# Patient Record
Sex: MALE | Race: WHITE | NOT HISPANIC OR LATINO | Employment: FULL TIME | URBAN - METROPOLITAN AREA
[De-identification: names, ages, dates, MRNs, and addresses within clinical notes are randomized per-mention and may not be internally consistent; named-entity substitution may affect disease eponyms.]

---

## 2017-02-20 ENCOUNTER — COMMUNICATION - HEALTHEAST (OUTPATIENT)
Dept: FAMILY MEDICINE | Facility: CLINIC | Age: 22
End: 2017-02-20

## 2017-06-13 ENCOUNTER — COMMUNICATION - HEALTHEAST (OUTPATIENT)
Dept: FAMILY MEDICINE | Facility: CLINIC | Age: 22
End: 2017-06-13

## 2017-06-13 DIAGNOSIS — Z91.09 OTHER ALLERGY, OTHER THAN TO MEDICINAL AGENTS: ICD-10-CM

## 2017-10-26 ENCOUNTER — RECORDS - HEALTHEAST (OUTPATIENT)
Dept: ADMINISTRATIVE | Facility: OTHER | Age: 22
End: 2017-10-26

## 2018-05-30 ENCOUNTER — COMMUNICATION - HEALTHEAST (OUTPATIENT)
Dept: FAMILY MEDICINE | Facility: CLINIC | Age: 23
End: 2018-05-30

## 2018-06-08 ENCOUNTER — OFFICE VISIT - HEALTHEAST (OUTPATIENT)
Dept: FAMILY MEDICINE | Facility: CLINIC | Age: 23
End: 2018-06-08

## 2018-06-08 DIAGNOSIS — Z91.09 ENVIRONMENTAL ALLERGIES: ICD-10-CM

## 2018-06-08 DIAGNOSIS — L70.8 OTHER ACNE: ICD-10-CM

## 2018-06-08 ASSESSMENT — MIFFLIN-ST. JEOR: SCORE: 1940.4

## 2018-08-09 ENCOUNTER — AMBULATORY - HEALTHEAST (OUTPATIENT)
Dept: NURSING | Facility: CLINIC | Age: 23
End: 2018-08-09

## 2018-08-09 DIAGNOSIS — Z23 IMMUNIZATION DUE: ICD-10-CM

## 2019-03-19 ENCOUNTER — OFFICE VISIT - HEALTHEAST (OUTPATIENT)
Dept: FAMILY MEDICINE | Facility: CLINIC | Age: 24
End: 2019-03-19

## 2019-03-19 DIAGNOSIS — R21 RASH: ICD-10-CM

## 2019-03-19 ASSESSMENT — MIFFLIN-ST. JEOR: SCORE: 1968.47

## 2019-09-20 ENCOUNTER — OFFICE VISIT - HEALTHEAST (OUTPATIENT)
Dept: FAMILY MEDICINE | Facility: CLINIC | Age: 24
End: 2019-09-20

## 2019-09-20 DIAGNOSIS — M75.82 TENDINITIS OF LEFT ROTATOR CUFF: ICD-10-CM

## 2019-09-20 DIAGNOSIS — Z28.21 INFLUENZA VACCINATION DECLINED: ICD-10-CM

## 2019-09-20 DIAGNOSIS — Z87.892 HISTORY OF ANAPHYLAXIS: ICD-10-CM

## 2019-09-23 ENCOUNTER — COMMUNICATION - HEALTHEAST (OUTPATIENT)
Dept: FAMILY MEDICINE | Facility: CLINIC | Age: 24
End: 2019-09-23

## 2019-12-19 ENCOUNTER — RECORDS - HEALTHEAST (OUTPATIENT)
Dept: ADMINISTRATIVE | Facility: OTHER | Age: 24
End: 2019-12-19

## 2020-04-07 ENCOUNTER — COMMUNICATION - HEALTHEAST (OUTPATIENT)
Dept: FAMILY MEDICINE | Facility: CLINIC | Age: 25
End: 2020-04-07

## 2020-04-07 DIAGNOSIS — Z87.892 HISTORY OF ANAPHYLAXIS: ICD-10-CM

## 2020-04-08 RX ORDER — EPINEPHRINE 0.3 MG/.3ML
INJECTION SUBCUTANEOUS
Qty: 2 EACH | Refills: 2 | Status: SHIPPED | OUTPATIENT
Start: 2020-04-08

## 2021-03-30 ENCOUNTER — OFFICE VISIT - HEALTHEAST (OUTPATIENT)
Dept: FAMILY MEDICINE | Facility: CLINIC | Age: 26
End: 2021-03-30

## 2021-03-30 DIAGNOSIS — L98.9 SKIN LESION: ICD-10-CM

## 2021-03-30 ASSESSMENT — MIFFLIN-ST. JEOR: SCORE: 1936.77

## 2021-04-02 LAB
LAB AP CHARGES (HE HISTORICAL CONVERSION): NORMAL
PATH REPORT.COMMENTS IMP SPEC: NORMAL
PATH REPORT.COMMENTS IMP SPEC: NORMAL
PATH REPORT.FINAL DX SPEC: NORMAL
PATH REPORT.GROSS SPEC: NORMAL
PATH REPORT.MICROSCOPIC SPEC OTHER STN: NORMAL
PATH REPORT.MICROSCOPIC SPEC OTHER STN: NORMAL
PATH REPORT.RELEVANT HX SPEC: NORMAL
RESULT FLAG (HE HISTORICAL CONVERSION): NORMAL

## 2021-04-06 ENCOUNTER — COMMUNICATION - HEALTHEAST (OUTPATIENT)
Dept: FAMILY MEDICINE | Facility: CLINIC | Age: 26
End: 2021-04-06

## 2021-06-01 VITALS — BODY MASS INDEX: 30.21 KG/M2 | HEIGHT: 70 IN | WEIGHT: 211 LBS

## 2021-06-01 NOTE — TELEPHONE ENCOUNTER
Who is calling:  Patient   Reason for Call:  Patient is requesting to fax Physical Therapy referral to [facility] Fax number [phone]  Date of last appointment with primary care: 9/20/19  Okay to leave a detailed message: No

## 2021-06-01 NOTE — PROGRESS NOTES
[facility] Clinic Note    Name: Aren Salamanca  : 1995   MRN: [id number]    Aren Salamanca is a 24 y.o. male presenting to discuss the following:     CC:   Chief Complaint   Patient presents with     Shoulder Pain     left shoulder X 1- 2 months ago       HPI:  Thinks had small rotator cuff in college, starting to irritate again. Has been bothersome last 2 months. When doing bench press or overhead press with heavier weights. When first started working out and was getting better was when first started rock climbing.     Symptoms are just on left, no weakness. No numbness. No trauma.     Needs epi pen refill.     ROS:   See HPI above.     PMH:   Patient Active Problem List   Diagnosis     Headache     Acne     Allergies       No past medical history on file.    PSH:   No past surgical history on file.      MEDICATIONS:   Current Outpatient Medications on File Prior to Visit   Medication Sig Dispense Refill     EPINEPHrine (EPIPEN/ADRENACLICK) 0.3 mg/0.3 mL injection INJECT.3ML IN THE MUSCLE AS DIRECTED 2 each 0     No current facility-administered medications on file prior to visit.        ALLERGIES:  No Known Allergies      PHYSICAL EXAM:   /78   Pulse 60   Temp 98.2  F (36.8  C) (Oral)   Resp 16   Wt 212 lb (96.2 kg)   BMI 30.86 kg/m     GENERAL: Aren is a pleasant, well appearing male, in no acute distress.   HEART: Regular rate and rhythm, no murmurs.   LUNGS: Clear to auscultation bilaterally, unlabored.   MSK: Bilateral shoulder range of motion if full. Negative arm drop test. Mild pain left shoulder empty can test. Negative Neer, Speed, and Yergason tests.   NEURO: Strength C5-T1 is 5/5 bilaterally. Sensation intact C5-T1 bilaterally.     ASSESSMENT & PLAN:   Aren Salamanca is a 24 y.o. male presenting today for evaluation of evaluation of left shoulder pain.    1. Tendinitis of left rotator cuff  - naproxen (NAPROSYN) 500 MG tablet; Take 1 tablet (500 mg total) by mouth 2 (two) times a day  with meals.  Dispense: 60 tablet; Refill: 2  - Ambulatory referral to Adult PT- External    Recommended rest, NSAIDS, and physical therapy. I do not suspect tear at this time. If not improving in 6-8 weeks, would consider MRI to further evaluate for structural integrity.     2. History of anaphylaxis  - EPINEPHrine (EPIPEN/ADRENACLICK/AUVI-Q) 0.3 mg/0.3 mL injection; INJECT.3ML IN THE MUSCLE AS DIRECTED  Dispense: 2 each; Refill: 0     Epipen is expiring. Allergic to yellow jacket wasps.     HM: Declined flu shot today.     RTC: March 2020 - annual physical exam     Missy Curry DO

## 2021-06-01 NOTE — TELEPHONE ENCOUNTER
Who is calling:  Patient  Reason for Call:  Patient stated that he would like the referral for PT to be sent to [place] as it will be more convenient for him. Patient stated that he doesn't have a specific group in mind but is open to anything available in [place].  Date of last appointment with primary care: 9/20/19  Okay to leave a detailed message: Yes  [phone]

## 2021-06-01 NOTE — TELEPHONE ENCOUNTER
I did a quick google search and there are quite a few physical therapy options in [place], he should call his insurance and find out which places are in network for him.  We can forward the referral to whichever place he decides to go to.

## 2021-06-01 NOTE — TELEPHONE ENCOUNTER
Left detailed message for pt that he will need to check with his insurance to see which PT in [place] is in-network for him.  Then he can call us back and let us know where he will be going and we can fax the referral at that time.

## 2021-06-02 VITALS — WEIGHT: 217.19 LBS | BODY MASS INDEX: 31.09 KG/M2 | HEIGHT: 70 IN

## 2021-06-03 VITALS
HEART RATE: 60 BPM | SYSTOLIC BLOOD PRESSURE: 112 MMHG | BODY MASS INDEX: 30.86 KG/M2 | TEMPERATURE: 98.2 F | DIASTOLIC BLOOD PRESSURE: 78 MMHG | WEIGHT: 212 LBS | RESPIRATION RATE: 16 BRPM

## 2021-06-05 VITALS
BODY MASS INDEX: 30.09 KG/M2 | SYSTOLIC BLOOD PRESSURE: 132 MMHG | HEART RATE: 60 BPM | HEIGHT: 70 IN | WEIGHT: 210.2 LBS | RESPIRATION RATE: 16 BRPM | DIASTOLIC BLOOD PRESSURE: 82 MMHG

## 2021-06-07 NOTE — TELEPHONE ENCOUNTER
RN cannot approve Refill Request    RN can NOT refill this medication med is not covered by policy/route to provider     . Last office visit: 6/8/2018 Moose Cabral MD Last Physical: Visit date not found Last MTM visit: Visit date not found Last visit same specialty: 9/20/2019 Missy Curry DO.  Next visit within 3 mo: Visit date not found  Next physical within 3 mo: Visit date not found      Enedina Irwin, [facility] Triage/Med Refill 4/8/2020    Requested Prescriptions   Pending Prescriptions Disp Refills     EPINEPHrine (EPIPEN/ADRENACLICK/AUVI-Q) 0.3 mg/0.3 mL injection 2 each 0     Sig: INJECT.3ML IN THE MUSCLE AS DIRECTED       There is no refill protocol information for this order

## 2021-06-16 NOTE — TELEPHONE ENCOUNTER
Patient returning Rica's call. I relayed the provider's message to patient. He stated understanding and no questions.

## 2021-06-16 NOTE — TELEPHONE ENCOUNTER
----- Message from Moose Cabral MD sent at 4/6/2021 12:23 PM CDT -----  Please call.  His skin lesion was benign.  This was called a dermatofibroma.  This can occur in the setting of trauma or insect bites in some circumstances.  No further testing  is needed unless he has any concerns.

## 2021-06-16 NOTE — PATIENT INSTRUCTIONS - HE
The skin lesion was removed and will be sent for pathological review  Keep the area clean and dry  Monitor for signs and symptoms of infection including redness, swelling, or discharge  Please notify me if there is a concern  The stitches will need to be removed in 10-14 days. You may make a follow-up appointment.

## 2021-06-16 NOTE — PROGRESS NOTES
Assessment/ Plan     1. Skin lesion, right arm  Possible cystic structure    Reviewed options including attempt to incise and drain versus punch biopsy versus dermatology referral  Preference was to initially try to drain it and then remove it    PROCEDURE NOTE:    Reviewed informed consent small risk of infection and scarring  Reviewed options.  1% lidocaine with epinephrine was used for anesthesia  Betadine was used to cleanse the skin surface  The tip of a #11 blade scalpel was used to attempt to incise and drain the lesion which was firm  Therefore, a 5 mm punch biopsy was then performed without complication  Two 4-0 Ethilon sutures were then used to approximate the wound edges  There was minimal blood loss and no complications  The wound was then cleaned and dressed    The specimen will be sent for pathological review  Recommend monitoring for signs and symptoms of infection  Recommend follow-up if there are concerns including redness, swelling, or discharge  Follow-up for suture removal in 10-14 days    - Surgical Pathology Exam      Subjective:       Aren Salamanca is a 26 y.o. male who presents to the clinic as he has had a lesion on his right forearm which has been present for the past 10 months.  He cannot think of any specific injury.  He denies redness or drainage.  This is a flesh colored dome-shaped structure which feels firm.  He has had some skin lesions removed from his back previously.    The following portions of the patient's history were reviewed and updated as appropriate: allergies, current medications, past family history, past medical history, past social history, past surgical history and problem list. Medications have been reconciled    Review of Systems   A 12 point comprehensive review of systems was negative except as noted.      Current Outpatient Medications   Medication Sig Dispense Refill     EPINEPHrine (EPIPEN/ADRENACLICK/AUVI-Q) 0.3 mg/0.3 mL injection INJECT.3ML IN THE MUSCLE AS  "DIRECTED 2 each 2     No current facility-administered medications for this visit.        Objective:      /82 (Patient Site: Left Arm, Patient Position: Sitting, Cuff Size: Adult Regular)   Pulse 60   Resp 16   Ht 5' 9.5\" (1.765 m)   Wt 210 lb 3.2 oz (95.3 kg)   BMI 30.60 kg/m      General appearance: alert, appears stated age   Head: normocephalic, without obvious abnormality, atraumatic  Skin: skin color, texture, turgor normal  Examination of the right forearm area reveals a 5 mm dome-shaped firm flesh-colored nodular lesion  Lymph nodes: Cervical nodes normal.  Neurologic: Alert and oriented X 3           No results found for this or any previous visit (from the past 168 hour(s)).       This note has been dictated using voice recognition software. Any grammatical or context distortions are unintentional and inherent to the software    Moose Cabral MD    "

## 2021-06-16 NOTE — TELEPHONE ENCOUNTER
Left message to call back for: Results  Information to relay to patient:  LMTCB, Please see message below from Dr. Cabral

## 2021-06-17 NOTE — PATIENT INSTRUCTIONS - HE
Patient Instructions by Missy Curry DO at 3/19/2019  8:20 AM     Author: Missy Curry DO Service: -- Author Type: Physician    Filed: 3/19/2019  8:42 AM Encounter Date: 3/19/2019 Status: Addendum    : Missy Curry DO (Physician)    Related Notes: Original Note by Missy Curry DO (Physician) filed at 3/19/2019  8:41 AM         Patient Education     Pityriasis Rosea  This is a harmless non-contagious rash. The exact cause is unknown. It is not an allergic reaction, and does not seem to be caused by a viral or fungal infection. Although anyone can get it, it is most often seen in children and young adults ages 10 to 35.  Pityriasis usually resolves on its own without treatment in 2 weeks.  In some people it may take 6 to 8 weeks to clear up.   Home care    For dry skin, use a moisturizing cream. For itchiness, use 1% hydrocortisone cream (no prescription needed) or calamine lotion 2 to 3 times a day.    Exposure to natural sunlight helps some people. It may help fade the rash, but doesn't seem to help the itching. Don't overdo it in the sun, as your skin may be more sensitive than usual. You dont want to burn yourself. Artificial sun lamps, sun beds, and tanning salons are not recommended.    This condition is not contagious. Your child may attend  or school while the rash is present.  Medicines  Talk with your healthcare provider before using any medicines. All medicines have side effects.    Medicines will not get rid of the rash.     Moisturizing skin creams may help.    Antihistamines may help with itching, but they can make you sleepy.    Topical steroids are sometimes used.  Follow-up care  Follow up with your healthcare provider, or as advised. Call your provider if the itching is not controlled by the above suggestions, or if the rash lasts longer than 3 months.  When to seek medical advice  Call your healthcare provider right away if any of these occur:    You develop a rash and are  pregnant    Severe itching    Signs of infection in the skin (increasing redness, drainage of pus, yellow-brown scabs)    Fever or other symptoms of a new illness  Date Last Reviewed: 8/1/2016 2000-2017 [employer]. [address] [place] [address], [place] [address]. All rights reserved. This information is not intended as a substitute for professional medical care. Always follow your healthcare professional's instructions.

## 2021-06-18 NOTE — PROGRESS NOTES
"Assessment/ Plan     1. Environmental allergies  Allergic reaction to bee stings    Patient was given a refill of an EpiPen to use as needed  He may start Benadryl if stung by a bee.  He may take 50 mg every 6 hours as needed  He may take Claritin or Zyrtec as needed  Follow-up recommended if not responding    2. Acne    Continue doxycycline as prescribed by dermatology  Follow-up as needed      Subjective:       Aren Salamanca is a 23 y.o. male who presents to the clinic for a refill of his EpiPen.  He has had an allergic reaction to bee stings and has had a severe allergic reaction with swelling.  He has used an EpiPen previously and would like to have one on hand.  He has not had a recent reaction.  Medical history is otherwise notable for acne treated by dermatology.  He is treated with oral doxycycline.    He otherwise feels well denies other health concerns.  Social history is notable for the fact that he graduated from the [employer] as a  and is currently searching for a job.  He currently lives at home.    Review of systems is negative for headache, chest pain, breathing concerns, or bowel changes.    The following portions of the patient's history were reviewed and updated as appropriate: allergies, current medications, past family history, past medical history, past social history, past surgical history and problem list.    Review of Systems   A 12 point comprehensive review of systems was negative except as noted.      Current Outpatient Prescriptions   Medication Sig Dispense Refill     doxycycline (VIBRAMYCIN) 50 MG capsule Take 50 mg by mouth daily.  2     EPINEPHrine (EPIPEN/ADRENACLICK) 0.3 mg/0.3 mL injection INJECT.3ML IN THE MUSCLE AS DIRECTED 2 each 0     No current facility-administered medications for this visit.        Objective:      /70  Pulse 60  Temp 98.1  F (36.7  C) (Oral)   Resp 16  Ht 5' 9.5\" (1.765 m)  Wt 211 lb (95.7 kg)  BMI 30.71 " kg/m2      General appearance: alert, appears stated age and cooperative  Head: Normocephalic, without obvious abnormality, atraumatic  Eyes: conjunctivae/corneas clear. PERRL, EOM's intact.   Nose: Nares normal. Septum midline  Throat: lips, mucosa, and tongue normal; teeth and gums normal  Neck: no adenopathy, supple, symmetrical  Lungs: clear to auscultation bilaterally  Heart: regular rate and rhythm, S1, S2 normal, no murmur, click, rub or gallop  Extremities: extremities normal, atraumatic, no cyanosis or edema  Skin: Skin color, texture, turgor normal. No rashes or lesions  Lymph nodes: Cervical nodes normal.  Neurologic: Alert and oriented X 3. Normal coordination and gait         No results found for this or any previous visit (from the past 168 hour(s)).       This note has been dictated using voice recognition software. Any grammatical or context distortions are unintentional and inherent to the software

## 2021-06-25 NOTE — PROGRESS NOTES
"[facility] Clinic Note    Aren Salamanca  1995   [id number]    Aren Salamanca is a 24 y.o. male presenting to discuss the following:     CC:   Chief Complaint   Patient presents with     Rash       HPI:  Aren presents today for evaluation of persistent rash.  First noticed a larger lesion on his left forearm, circular, no scaling, does not itch, is asymptomatic.  Then noticed spreading to his trunk.  Again, lesions are asymptomatic.  Symptoms have been present for the last 4 weeks, not getting any better.  Therefore, he came in for further evaluation.    ROS:   See HPI above.     PMH:   Patient Active Problem List   Diagnosis     Headache     Acne     Allergies       No past medical history on file.    PSH:   No past surgical history on file.      MEDICATIONS:   Current Outpatient Medications on File Prior to Visit   Medication Sig Dispense Refill     EPINEPHrine (EPIPEN/ADRENACLICK) 0.3 mg/0.3 mL injection INJECT.3ML IN THE MUSCLE AS DIRECTED 2 each 0     [DISCONTINUED] doxycycline (VIBRAMYCIN) 50 MG capsule Take 50 mg by mouth daily.  2     No current facility-administered medications on file prior to visit.        ALLERGIES:  No Known Allergies    PHYSICAL EXAM:   /72   Pulse 60   Temp 98.3  F (36.8  C) (Oral)   Resp 16   Ht 5' 9.5\" (1.765 m)   Wt 217 lb 3 oz (98.5 kg)   BMI 31.61 kg/m     GENERAL: Aren is a pleasant, well appearing male in no acute distress.   HEART: Regular rate and rhythm, no murmurs.  LUNGS: Clear to auscultation bilaterally, unlabored.   DERM: Circular edematous patches with a few raised smaller papules with largest lesion on the right forearm approximately 2 cm in diameter and lesions on trunk approximately 1/2-1 cm in diameter.  Scaling on lesions.    ASSESSMENT & PLAN:   Aren Salamanca is a 24 y.o. male presenting today for evaluation of rash which is not going away.    1. Rash  Rashes consistent with pityriasis rosea.  Discussed that this is viral and self resolving.  " There are no prescriptions to make this go away faster.  May utilize topical steroids or antihistamines if pruritic, but is he is asymptomatic, this is not necessary.  Discussed symptoms usually persist for 6-8 weeks, but occasionally can last longer.    If lesions are getting bigger, spreading, or becoming more itchy, would be reasonable to trial topical antifungal for tinea lesion empirically.  However, I do not think this would be beneficial at this time.    RTC: 1 year - annual physical exam     Missy Curry,

## 2021-08-05 ENCOUNTER — TELEPHONE (OUTPATIENT)
Dept: ORTHOPEDICS | Facility: CLINIC | Age: 26
End: 2021-08-05

## 2021-08-05 NOTE — TELEPHONE ENCOUNTER
DIAGNOSIS: knees/ ref by former patient of Dr. Mccall/ patient having imaging and records pushed from [facility]/ no previous surgery/ [facility]   APPOINTMENT DATE: 8.10.21   NOTES STATUS DETAILS   OFFICE NOTE from referring provider N/A    OFFICE NOTE from other specialist Care Everywhere PT in Care Everywhere  10.16.20 VICENTE Lucas   DISCHARGE SUMMARY from hospital N/A    DISCHARGE REPORT from the ER N/A    OPERATIVE REPORT N/A    MEDICATION LIST N/A    EMG (for Spine) N/A    IMPLANT RECORD/STICKER N/A    LABS     CBC/DIFF N/A    CULTURES N/A    INJECTIONS DONE IN RADIOLOGY N/A    MRI N/A    CT SCAN N/A    XRAYS (IMAGES & REPORTS) PACS 10.16.20 L knee, R knee, [facility]   TUMOR     PATHOLOGY  Slides & report N/A      Action 8.5.21 MJ   Action Taken Requested med recs and imaging from [facility].

## 2021-08-05 NOTE — TELEPHONE ENCOUNTER
Informed patient that our medical records department is working on obtaining records from [facility]. ATC thanked patient for being proactive about this.    He has no further questions and was appreciative of the return call.    ROSENDA Goodman

## 2021-08-05 NOTE — TELEPHONE ENCOUNTER
[facility] Call Center    Phone Message    May a detailed message be left on voicemail: yes     Reason for Call: Other: Patient want to make sure that xray and medical records were received from [facility]. Please call patient back to confirm.     Action Taken: Message routed to:  [facility] ([facility] Orthopedics    Travel Screening: Not Applicable

## 2021-08-10 ENCOUNTER — OFFICE VISIT (OUTPATIENT)
Dept: ORTHOPEDICS | Facility: CLINIC | Age: 26
End: 2021-08-10
Payer: COMMERCIAL

## 2021-08-10 ENCOUNTER — PRE VISIT (OUTPATIENT)
Dept: ORTHOPEDICS | Facility: CLINIC | Age: 26
End: 2021-08-10

## 2021-08-10 VITALS — HEIGHT: 71 IN | WEIGHT: 210 LBS | BODY MASS INDEX: 29.4 KG/M2

## 2021-08-10 DIAGNOSIS — M76.899 QUADRICEPS TENDONITIS: Primary | ICD-10-CM

## 2021-08-10 PROCEDURE — 99203 OFFICE O/P NEW LOW 30 MIN: CPT | Mod: GC | Performed by: ORTHOPAEDIC SURGERY

## 2021-08-10 ASSESSMENT — MIFFLIN-ST. JEOR: SCORE: 1951.74

## 2021-08-10 NOTE — LETTER
8/10/2021         RE: Aren Salamanca  [address] [address]        Dear Colleague,    Thank you for referring your patient, Aren Salamanca, to the [facility] [place]. Please see a copy of my visit note below.    CHIEF CONCERN:   Chief Complaint   Patient presents with     Consult     bilateral knee        HISTORY:   Aren Salamanca is a 26 year old male who presents to clinic today for evaluation of bilateral knee pain. Pt states that his pain began in spring of 2020. It is localized to the anterior aspect of his knees above his patella. Left is worse than right. It is worse with stairs, weighted squats, and running. It is limiting his activities. He has tried PT for 2-3 months last fall. This initially made him better but he has recently began to have worsening symptoms. He has not had any injections or an MRI.       PAST MEDICAL HISTORY: (Reviewed with the patient and in the [facility] medical record)  No past medical history on file.    PAST SURGICAL HISTORY: (Reviewed with the patient and in the [facility] medical record)  No past surgical history on file.    MEDICATIONS: (Reviewed with the patient and in the [facility] medical record)  Notable medications include:  Current Outpatient Medications   Medication Sig Dispense Refill     EPINEPHrine (EPIPEN/ADRENACLICK/AUVI-Q) 0.3 mg/0.3 mL injection [EPINEPHRINE (EPIPEN/ADRENACLICK/AUVI-Q) 0.3 MG/0.3 ML INJECTION] INJECT.3ML IN THE MUSCLE AS DIRECTED 2 each 2        ALLERGIES: (Reviewed with the patient and in the [facility] medical record)  Allergies   Allergen Reactions     Wasp Venom [Wasp Venom Protein] Unknown       SOCIAL HISTORY: (Reviewed with the patient and in the medical record)  --Tobacco: none  --Occupation:   --Avocation/Sport: weight lifting, running    FAMILY HISTORY: (Reviewed with the patient and in the medical record)  -- No family history of bleeding, clotting, or difficulty with anesthesia    REVIEW  "OF SYSTEMS: (Reviewed with the patient and on the health intake form)  -- A comprehensive 10 point review of systems was conducted and is negative except as noted in the HPI    EXAM:   General: Awake, Alert and Oriented, No acute Distress. Articulate and Interactive  Ht 1.791 m (5' 10.5\")   Wt 95.3 kg (210 lb)   BMI 29.71 kg/m     HEENT: NC/AT  Cardiovascular: regular rate  Pulmonary: unlabored breathing  Neuro: motor/sensory grossly intact  Psych: normal mood and affect    Left lower extremity and knee exam:    Skin is Warm and Well perfused, no suggestion of infection, no previous incisions    ROM: 0-135    No effusion. No medial joint line pain. No lateral joint line pain. Tender over quad tendon insertion at proximal patella    Full range of motion, negative McMurrays    Stable to varus and valgus stress    Negative Lachmans, negative posterior drawer, negative pivot shift    Negative patellar tilt, negative patellar laxity, no J-sign present, no crepitus    EHL/FHL/TA/GS 5/5    Sensation intact L3-S1    Brisk distal capillary refill    Right lower extremity and knee exam:    Skin is Warm and Well perfused, no suggestion of infection, no previous incisions    ROM: 0-135    No effusion. No medial joint line pain. No lateral joint line pain. Tender over quad tendon insertion at proximal patella    Full range of motion, negative McMurrays    Stable to varus and valgus stress    Negative Lachmans, negative posterior drawer, negative pivot shift    Negative patellar tilt, negative patellar laxity, no J-sign present, no crepitus    EHL/FHL/TA/GS 5/5    Sensation intact L3-S1    Brisk distal capillary refill    IMAGING:  Plain Radiographs: AP, lateral, and sunrise views of bilateral knees reviewed and demonstrate no acute osseous abnormality and no degenerative change       ASSESSMENT:  1. 26 year old male with bilateral quadriceps insertional tendinopathy vs patellofemoral chondral lesion    PLAN:  1. We discussed " the possible pathologies regarding his knees. At this point we recommend obtaining an MRI to better evaluate his quad tendon and the patellofemoral articular cartilage. We will schedule this today. Dr. Mccall will call him with the results of his MRI and further recommendations, including possible PRP injection. He is in agreement with this plan. All questions answered prior to his departure from clinic.     I spent 35 minutes with this patient, 30 minutes dedicated to counseling, education and development of a treatment plan    Jeffrey Marroquin MD  Fellow, Sports Medicine & Shoulder  [facility] Orthopaedic Surgery    I was present with the fellow during the history and exam.  I discussed the case with the fellow and agree with the findings as documented in the assessment and plan.      Brant Mccall MD

## 2021-08-10 NOTE — PROGRESS NOTES
CHIEF CONCERN:   Chief Complaint   Patient presents with     Consult     bilateral knee        HISTORY:   Aren Salamanca is a 26 year old male who presents to clinic today for evaluation of bilateral knee pain. Pt states that his pain began in spring of 2020. It is localized to the anterior aspect of his knees above his patella. Left is worse than right. It is worse with stairs, weighted squats, and running. It is limiting his activities. He has tried PT for 2-3 months last fall. This initially made him better but he has recently began to have worsening symptoms. He has not had any injections or an MRI.       PAST MEDICAL HISTORY: (Reviewed with the patient and in the EPIC medical record)  No past medical history on file.    PAST SURGICAL HISTORY: (Reviewed with the patient and in the EPIC medical record)  No past surgical history on file.    MEDICATIONS: (Reviewed with the patient and in the EPIC medical record)  Notable medications include:  Current Outpatient Medications   Medication Sig Dispense Refill     EPINEPHrine (EPIPEN/ADRENACLICK/AUVI-Q) 0.3 mg/0.3 mL injection [EPINEPHRINE (EPIPEN/ADRENACLICK/AUVI-Q) 0.3 MG/0.3 ML INJECTION] INJECT.3ML IN THE MUSCLE AS DIRECTED 2 each 2        ALLERGIES: (Reviewed with the patient and in the EPIC medical record)  Allergies   Allergen Reactions     Wasp Venom [Wasp Venom Protein] Unknown       SOCIAL HISTORY: (Reviewed with the patient and in the medical record)  --Tobacco: none  --Occupation:   --Avocation/Sport: weight lifting, running    FAMILY HISTORY: (Reviewed with the patient and in the medical record)  -- No family history of bleeding, clotting, or difficulty with anesthesia    REVIEW OF SYSTEMS: (Reviewed with the patient and on the health intake form)  -- A comprehensive 10 point review of systems was conducted and is negative except as noted in the HPI    EXAM:   General: Awake, Alert and Oriented, No acute Distress. Articulate and  "Interactive  Ht 1.791 m (5' 10.5\")   Wt 95.3 kg (210 lb)   BMI 29.71 kg/m     HEENT: NC/AT  Cardiovascular: regular rate  Pulmonary: unlabored breathing  Neuro: motor/sensory grossly intact  Psych: normal mood and affect    Left lower extremity and knee exam:    Skin is Warm and Well perfused, no suggestion of infection, no previous incisions    ROM: 0-135    No effusion. No medial joint line pain. No lateral joint line pain. Tender over quad tendon insertion at proximal patella    Full range of motion, negative McMurrays    Stable to varus and valgus stress    Negative Lachmans, negative posterior drawer, negative pivot shift    Negative patellar tilt, negative patellar laxity, no J-sign present, no crepitus    EHL/FHL/TA/GS 5/5    Sensation intact L3-S1    Brisk distal capillary refill    Right lower extremity and knee exam:    Skin is Warm and Well perfused, no suggestion of infection, no previous incisions    ROM: 0-135    No effusion. No medial joint line pain. No lateral joint line pain. Tender over quad tendon insertion at proximal patella    Full range of motion, negative McMurrays    Stable to varus and valgus stress    Negative Lachmans, negative posterior drawer, negative pivot shift    Negative patellar tilt, negative patellar laxity, no J-sign present, no crepitus    EHL/FHL/TA/GS 5/5    Sensation intact L3-S1    Brisk distal capillary refill    IMAGING:  Plain Radiographs: AP, lateral, and sunrise views of bilateral knees reviewed and demonstrate no acute osseous abnormality and no degenerative change       ASSESSMENT:  1. 26 year old male with bilateral quadriceps insertional tendinopathy vs patellofemoral chondral lesion    PLAN:  1. We discussed the possible pathologies regarding his knees. At this point we recommend obtaining an MRI to better evaluate his quad tendon and the patellofemoral articular cartilage. We will schedule this today. Dr. Mccall will call him with the results of his MRI and " further recommendations, including possible PRP injection. He is in agreement with this plan. All questions answered prior to his departure from clinic.     I spent 35 minutes with this patient, 30 minutes dedicated to counseling, education and development of a treatment plan    Jeffrey Marroquin MD  Fellow, Sports Medicine & Shoulder  [facility]    I was present with the fellow during the history and exam.  I discussed the case with the fellow and agree with the findings as documented in the assessment and plan.

## 2021-08-20 ENCOUNTER — ANCILLARY PROCEDURE (OUTPATIENT)
Dept: MRI IMAGING | Facility: CLINIC | Age: 26
End: 2021-08-20
Attending: ORTHOPAEDIC SURGERY
Payer: COMMERCIAL

## 2021-08-20 PROCEDURE — 73721 MRI JNT OF LWR EXTRE W/O DYE: CPT | Mod: LT | Performed by: RADIOLOGY

## 2021-08-20 PROCEDURE — 73721 MRI JNT OF LWR EXTRE W/O DYE: CPT | Mod: RT | Performed by: RADIOLOGY

## 2021-08-31 DIAGNOSIS — M76.899 QUADRICEPS TENDONITIS: Primary | ICD-10-CM

## 2021-08-31 DIAGNOSIS — M76.892 TENDINITIS OF BOTH QUADRICEP TENDONS: ICD-10-CM

## 2021-08-31 DIAGNOSIS — M76.891 TENDINITIS OF BOTH QUADRICEP TENDONS: ICD-10-CM

## 2021-09-27 ENCOUNTER — THERAPY VISIT (OUTPATIENT)
Dept: PHYSICAL THERAPY | Facility: CLINIC | Age: 26
End: 2021-09-27
Attending: ORTHOPAEDIC SURGERY
Payer: COMMERCIAL

## 2021-09-27 DIAGNOSIS — M76.899 QUADRICEPS TENDONITIS: Primary | ICD-10-CM

## 2021-09-27 PROCEDURE — 97110 THERAPEUTIC EXERCISES: CPT | Mod: GP | Performed by: PHYSICAL THERAPIST

## 2021-09-27 PROCEDURE — 97161 PT EVAL LOW COMPLEX 20 MIN: CPT | Mod: GP | Performed by: PHYSICAL THERAPIST

## 2021-09-27 ASSESSMENT — ACTIVITIES OF DAILY LIVING (ADL)
KNEE_ACTIVITY_OF_DAILY_LIVING_SCORE: 88.57
SIT WITH YOUR KNEE BENT: ACTIVITY IS NOT DIFFICULT
GO DOWN STAIRS: ACTIVITY IS MINIMALLY DIFFICULT
GO UP STAIRS: ACTIVITY IS MINIMALLY DIFFICULT
RISE FROM A CHAIR: ACTIVITY IS NOT DIFFICULT
WALK: ACTIVITY IS NOT DIFFICULT
HOW_WOULD_YOU_RATE_THE_OVERALL_FUNCTION_OF_YOUR_KNEE_DURING_YOUR_USUAL_DAILY_ACTIVITIES?: NEARLY NORMAL
WEAKNESS: THE SYMPTOM AFFECTS MY ACTIVITY SLIGHTLY
PAIN: THE SYMPTOM AFFECTS MY ACTIVITY SLIGHTLY
GIVING WAY, BUCKLING OR SHIFTING OF KNEE: I DO NOT HAVE THE SYMPTOM
SWELLING: I DO NOT HAVE THE SYMPTOM
SQUAT: ACTIVITY IS MINIMALLY DIFFICULT
AS_A_RESULT_OF_YOUR_KNEE_INJURY,_HOW_WOULD_YOU_RATE_YOUR_CURRENT_LEVEL_OF_DAILY_ACTIVITY?: NEARLY NORMAL
HOW_WOULD_YOU_RATE_THE_CURRENT_FUNCTION_OF_YOUR_KNEE_DURING_YOUR_USUAL_DAILY_ACTIVITIES_ON_A_SCALE_FROM_0_TO_100_WITH_100_BEING_YOUR_LEVEL_OF_KNEE_FUNCTION_PRIOR_TO_YOUR_INJURY_AND_0_BEING_THE_INABILITY_TO_PERFORM_ANY_OF_YOUR_USUAL_DAILY_ACTIVITIES?: 60
KNEE_ACTIVITY_OF_DAILY_LIVING_SUM: 62
STIFFNESS: I DO NOT HAVE THE SYMPTOM
STAND: ACTIVITY IS MINIMALLY DIFFICULT
KNEEL ON THE FRONT OF YOUR KNEE: ACTIVITY IS NOT DIFFICULT
RAW_SCORE: 62
LIMPING: I DO NOT HAVE THE SYMPTOM

## 2021-09-27 NOTE — PROGRESS NOTES
Physical Therapy Initial Examination/Evaluation  September 27, 2021    Aren Salamanca is a 26 year old male referred to physical therapy by Brant Mccall MD for treatment of quadriceps tendonitis with Precautions/Restrictions/MD instructions E&T    Therapist Impression:   Aren presents to therapy with chronic bilateral knee pain. He demonstrates impaired squatting technique with single leg stance, decreased muscle length, decreased strength, and pain affecting function - all of which are consistent with the referring providers diagnosis. Skilled therapy is required to address the limitations listed above, make progress towards therapy goals, improve quality of life, and return to previous level of function.     Subjective:  DOI/onset: 4/1/2020 DOS: NA  In April 2020, Aren started developing knee pain when working out at home during the pandemic. Went back to the gym and started lifting his same weight and developing more pain. Did start seeing PT and it has been fluctuating ever since then. Did have a recent MRI showing quadriceps tendonitis bilaterally. The past 6 weeks it has been getting better - has started over from zero with light weight and a slant angle under his feet.   Acute Injury or Gradual Onset?: Gradual injury over time  Mechanism of Injury: unknown  Related PMH: none Previous Treatment: PT Effect of prior treatment: good  Imaging: MRI   Left IMPRESSION:   1. Partial focal tear distal most quadriceps tendon at the superior  pole of the patella involving the vastus intermedius portion with  dimensions as noted above. No evidence of full-thickness tear.  2. Tiny area chondromalacia lateral patellar facet.   Right Impression:  1. Small focal tear of the mid-substance of the distal quadriceps  tendon. Slightly smaller area of involvement than the contralateral  knee.   2. Mild chondromalacia of the patellofemoral compartment.   Chief Complaint/Functional Limitations:   Lifting weight  and see below in  therapy evaluation codes   Pain: rest 0 /10, activity 3/10 Location: quadriceps tendon Frequency: Intermittent Described as: dull Alleviated by: Rest and Movement Progression of Symptoms: Gradually getting better. Time of day when pain is worse: Activity related  Current HEP/exercise regimen: weight lift 3x/week, olympic lifting (prior to injury)  Patient's goals are see chief complaints get back to lifting, running     Answers for HPI/ROS submitted by the patient on 9/22/2021  Reason for Visit:: Physical Therapy  When problem began:: 4/1/2020  How problem occurred:: lifting  Number scale: 1/10  General health as reported by patient: good  Please check all that apply to your current or past medical history: none  Medical allergies: none  Surgeries: none  Medications you are currently taking: none  Occupation::   What are your primary job tasks: computer work, prolonged sitting    Return to MD:  PRN    KNEE EVALUATION    Static Posture  No obvious findings    Dynamic Movement Screen  Single leg stance observations: No significant findings for eyes open/closed  Double limb squat observations: Able to perform full deep squat with pain  Single limb squat observations: Anterior knee translation, Knee valgus and Contralateral hip drop  Gait: No significant findings    Range of Motion  Hip Joint Screen: WNL      Knee ROM Extension Flexion   Left 0 140   Right 0 140      Flexibility Quadriceps Hamstrings Ankle Figure 4   Left moderate mild none/WNL moderate   Right moderate mild none/WNL moderate     Hip and Knee Strength   MMT Hip Abduction Hip Extension Knee Flexion   Left 4/5 4+/5 5/5   Right 4+/5 4+/5 4/5     Knee MMT Quadriceps set Straight Leg Raise   Left Good Good   Right Good Good     Knee ligaments and meniscus: Not assessed    Patellofemoral assessment: negative findings    Palpation  Left: No tenderness to palpation  Right: No tenderness to palpation    Assessment/Plan:  Patient is a 26 year  old male with both sides knee complaints.    Patient has the following significant findings with corresponding treatment plan.                Diagnosis 1:  Bilateral Knee  Pain -  manual therapy, splint/taping/bracing/orthotics, self management, education and home program  Decreased joint mobility - manual therapy and therapeutic exercise  Decreased strength - therapeutic exercise and therapeutic activities    Therapy Evaluation Codes:     Cumulative Therapy Evaluation is: Low complexity.    Previous and current functional limitations:  (See Goal Flow Sheet for this information)    Short term and Long term goals: (See Goal Flow Sheet for this information)     Communication ability:  Patient appears to be able to clearly communicate and understand verbal and written communication and follow directions correctly.  Treatment Explanation - The following has been discussed with the patient:   RX ordered/plan of care  Anticipated outcomes  Possible risks and side effects  This patient would benefit from PT intervention to resume normal activities.   Rehab potential is excellent.    Frequency:  2 X a month, once daily  Duration:  for 3 months  Discharge Plan:  Achieve all LTG.  Independent in home treatment program.  Reach maximal therapeutic benefit.    Please refer to the daily flowsheet for treatment today, total treatment time and time spent performing 1:1 timed codes.     Please refer to the daily flowsheet for treatment today, total treatment time and time spent performing 1:1 timed codes.

## 2021-10-10 ENCOUNTER — HEALTH MAINTENANCE LETTER (OUTPATIENT)
Age: 26
End: 2021-10-10

## 2021-10-11 ENCOUNTER — THERAPY VISIT (OUTPATIENT)
Dept: PHYSICAL THERAPY | Facility: CLINIC | Age: 26
End: 2021-10-11
Payer: COMMERCIAL

## 2021-10-11 DIAGNOSIS — M76.899 QUADRICEPS TENDONITIS: Primary | ICD-10-CM

## 2021-10-11 PROCEDURE — 97110 THERAPEUTIC EXERCISES: CPT | Mod: GP | Performed by: PHYSICAL THERAPIST

## 2021-10-25 ENCOUNTER — THERAPY VISIT (OUTPATIENT)
Dept: PHYSICAL THERAPY | Facility: CLINIC | Age: 26
End: 2021-10-25
Payer: COMMERCIAL

## 2021-10-25 DIAGNOSIS — M76.899 QUADRICEPS TENDONITIS: ICD-10-CM

## 2021-10-25 PROCEDURE — 97110 THERAPEUTIC EXERCISES: CPT | Mod: GP | Performed by: PHYSICAL THERAPIST

## 2021-11-22 ENCOUNTER — THERAPY VISIT (OUTPATIENT)
Dept: PHYSICAL THERAPY | Facility: CLINIC | Age: 26
End: 2021-11-22
Payer: COMMERCIAL

## 2021-11-22 DIAGNOSIS — M76.899 QUADRICEPS TENDONITIS: ICD-10-CM

## 2021-11-22 PROCEDURE — 97110 THERAPEUTIC EXERCISES: CPT | Mod: GP | Performed by: PHYSICAL THERAPIST

## 2021-11-22 PROCEDURE — 97530 THERAPEUTIC ACTIVITIES: CPT | Mod: GP | Performed by: PHYSICAL THERAPIST

## 2021-11-22 PROCEDURE — 97140 MANUAL THERAPY 1/> REGIONS: CPT | Mod: GP | Performed by: PHYSICAL THERAPIST

## 2022-01-15 PROBLEM — M76.899 QUADRICEPS TENDONITIS: Status: RESOLVED | Noted: 2021-09-27 | Resolved: 2022-01-15

## 2022-01-15 NOTE — PROGRESS NOTES
Patient did not return for further treatment and no additional progress was noted.  Please refer to the progress note and goal flowsheet completed on   for discharge information.    Francisca Aragon, PT

## 2022-09-18 ENCOUNTER — HEALTH MAINTENANCE LETTER (OUTPATIENT)
Age: 27
End: 2022-09-18

## 2023-01-29 ENCOUNTER — HEALTH MAINTENANCE LETTER (OUTPATIENT)
Age: 28
End: 2023-01-29

## 2023-01-31 ENCOUNTER — E-VISIT (OUTPATIENT)
Dept: FAMILY MEDICINE | Facility: CLINIC | Age: 28
End: 2023-01-31
Payer: COMMERCIAL

## 2023-01-31 DIAGNOSIS — Z71.84 TRAVEL ADVICE ENCOUNTER: Primary | ICD-10-CM

## 2023-01-31 PROCEDURE — 99422 OL DIG E/M SVC 11-20 MIN: CPT | Performed by: FAMILY MEDICINE

## 2023-02-01 RX ORDER — ATOVAQUONE AND PROGUANIL HYDROCHLORIDE 250; 100 MG/1; MG/1
1 TABLET, FILM COATED ORAL DAILY
Qty: 24 TABLET | Refills: 0 | Status: SHIPPED | OUTPATIENT
Start: 2023-02-01 | End: 2023-11-20

## 2023-02-01 NOTE — PATIENT INSTRUCTIONS
Aren,    I will send a prescription for Malarone to your pharmacy as I did for your father.  You will start this 1 to 2 days prior to travel and then take this during travel and for a week following travel.    I also sent a prescription for a typhoid vaccine for your dad but did not yet send that for you.  That is a prescription which includes 4 capsules and may protect a diarrhea type of illness.  Please have me know if you like to consider that as well.    Also, the CDC has a good website regarding travel which gives recommendations based on where you are traveling.  You may want to check that out.    I hope you have a great trip.    Moose Cabral MD

## 2023-09-07 ENCOUNTER — TRANSFERRED RECORDS (OUTPATIENT)
Dept: HEALTH INFORMATION MANAGEMENT | Facility: CLINIC | Age: 28
End: 2023-09-07
Payer: COMMERCIAL

## 2023-11-20 ENCOUNTER — OFFICE VISIT (OUTPATIENT)
Dept: ALLERGY | Facility: CLINIC | Age: 28
End: 2023-11-20
Attending: FAMILY MEDICINE
Payer: COMMERCIAL

## 2023-11-20 VITALS — HEART RATE: 73 BPM | HEIGHT: 70 IN | WEIGHT: 225.4 LBS | BODY MASS INDEX: 32.27 KG/M2 | OXYGEN SATURATION: 99 %

## 2023-11-20 DIAGNOSIS — T63.461A ALLERGIC REACTION TO WASP STING: ICD-10-CM

## 2023-11-20 PROCEDURE — 86003 ALLG SPEC IGE CRUDE XTRC EA: CPT | Performed by: ALLERGY & IMMUNOLOGY

## 2023-11-20 PROCEDURE — 99243 OFF/OP CNSLTJ NEW/EST LOW 30: CPT | Performed by: ALLERGY & IMMUNOLOGY

## 2023-11-20 PROCEDURE — 36415 COLL VENOUS BLD VENIPUNCTURE: CPT | Performed by: ALLERGY & IMMUNOLOGY

## 2023-11-20 NOTE — LETTER
"    11/20/2023         RE: Aren Salamanca  [address] [facility] [address]        Dear Colleague,    Thank you for referring your patient, Aren Salamanca, to the [facility]. Please see a copy of my visit note below.          Subjective  Aren is a 28 year old, presenting for the following health issues:  Allergy Consult (wasp sting)    HPI     Chief complaint: Wasp venom allergy    History of present illness: This is a pleasant 28-year-old gentleman that was asked to see for evaluation by Dr. Diop in regards to a possible bee sting reaction.  Patient states that when he was around 13 years of age he developed a large local swelling on his arm after being stung.  He states it was very painful.  After that time he was taken to an allergist and tested and was told he was slightly positive to some of the venomous insects.  He then carry an epinephrine device and had it administered twice to him when he was stung but he had no symptoms when the epinephrine device was administered.  He is wondering if he needs to continue to carry this.  He does not recollect any systemic hives, swelling or shortness of breath when he was initially stung.  I do not have any records to review.    Past medical history: Otherwise unremarkable    Social history: Non-smoker    Family history: Noncontributory      Review of Systems   Constitutional, HEENT, cardiovascular, pulmonary, gi and gu systems are negative, except as otherwise noted.      Objective   Pulse 73   Ht 1.778 m (5' 10\")   Wt 102.2 kg (225 lb 6.4 oz)   SpO2 99%   BMI 32.34 kg/m    Body mass index is 32.34 kg/m .  Physical Exam     Gen: Pleasant male not in acute distress  HEENT: Eyes no erythema of the bulbar or palpebral conjunctiva, no edema. Ears: No deformities or lesions. Nose: No congestion,  Mouth: Throat clear, no lip or tongue edema.   Neck: No masses lesions or swelling  Respiratory: No coughing with breathing, no " retractions  Lymph: No visible supraclavicular or cervical lymphadenopathy  Skin: No rashes or lesions  Psych: Alert and appropriate for age    Impression report and plan:    1.  Possible allergic reaction to bee sting    Large local reactions are not considered an allergic reaction to bee sting.  However, recall memory may not indicate the entire story and given that he was prescribed an epinephrine device and did have what sounds like allergy testing.  Pending this test could consider skin testing.  If skin and blood testing is negative I think he can discontinue carrying his epinephrine device.            Again, thank you for allowing me to participate in the care of your patient.        Sincerely,        Maira TABARES MD

## 2023-11-20 NOTE — PROGRESS NOTES
"      Levy Younger is a 28 year old, presenting for the following health issues:  Allergy Consult (wasp sting)    HPI     Chief complaint: Wasp venom allergy    History of present illness: This is a pleasant 28-year-old gentleman that was asked to see for evaluation by Dr. Diop in regards to a possible bee sting reaction.  Patient states that when he was around 13 years of age he developed a large local swelling on his arm after being stung.  He states it was very painful.  After that time he was taken to an allergist and tested and was told he was slightly positive to some of the venomous insects.  He then carry an epinephrine device and had it administered twice to him when he was stung but he had no symptoms when the epinephrine device was administered.  He is wondering if he needs to continue to carry this.  He does not recollect any systemic hives, swelling or shortness of breath when he was initially stung.  I do not have any records to review.    Past medical history: Otherwise unremarkable    Social history: Non-smoker    Family history: Noncontributory      Review of Systems   Constitutional, HEENT, cardiovascular, pulmonary, gi and gu systems are negative, except as otherwise noted.      Objective    Pulse 73   Ht 1.778 m (5' 10\")   Wt 102.2 kg (225 lb 6.4 oz)   SpO2 99%   BMI 32.34 kg/m    Body mass index is 32.34 kg/m .  Physical Exam     Gen: Pleasant male not in acute distress  HEENT: Eyes no erythema of the bulbar or palpebral conjunctiva, no edema. Ears: No deformities or lesions. Nose: No congestion,  Mouth: Throat clear, no lip or tongue edema.   Neck: No masses lesions or swelling  Respiratory: No coughing with breathing, no retractions  Lymph: No visible supraclavicular or cervical lymphadenopathy  Skin: No rashes or lesions  Psych: Alert and appropriate for age    Impression report and plan:    1.  Possible allergic reaction to bee sting    Large local reactions are not considered " an allergic reaction to bee sting.  However, recall memory may not indicate the entire story and given that he was prescribed an epinephrine device and did have what sounds like allergy testing.  Pending this test could consider skin testing.  If skin and blood testing is negative I think he can discontinue carrying his epinephrine device.

## 2023-11-24 LAB
HONEY BEE IGE QN: <0.1 KU(A)/L
PAPER WASP IGE QN: <0.1 KU(A)/L
WHITEFACED HORNET IGE QN: <0.1 KU(A)/L
YELLOW HORNET IGE QN: <0.1 KU(A)/L
YELLOW JACKET IGE QN: 0.14 KU(A)/L

## 2024-01-16 ENCOUNTER — OFFICE VISIT (OUTPATIENT)
Dept: ALLERGY | Facility: CLINIC | Age: 29
End: 2024-01-16
Payer: COMMERCIAL

## 2024-01-16 VITALS — HEART RATE: 69 BPM | OXYGEN SATURATION: 99 % | RESPIRATION RATE: 16 BRPM

## 2024-01-16 DIAGNOSIS — Z91.030 BEE STING ALLERGY: Primary | ICD-10-CM

## 2024-01-16 PROCEDURE — 95017 ALL TSTG PERQ&IQ W/VENOMS: CPT | Performed by: ALLERGY & IMMUNOLOGY

## 2024-01-16 NOTE — PROGRESS NOTES
Levy Younger is a 28 year old, presenting for the following health issues:  RECHECK (Venom skin testing)    HPI     Chief complaint:  Venom testing    History of present illness:  This is a pleasant 28 year old man here today to undergo venom skin testing.        At today s visit the patient/parent and I engaged in an informed consent discussion about allergy testing.  We discussed skin testing, blood testing,  and the alternative of not undergoing any testing. The patient/ parent has a preference for skin testing. We then discussed the risks and benefits of skin testing.  The patient/ parent understands skin testing risks can include, but are not limited to, urticaria, angioedema, shortness of breath, and severe anaphylaxis.  The benefits include, but are not limited, to evaluation for allergens causing symptoms.  After answering the patients/parents questions they have agreed to proceed with skin testing.              [id number]    Test Information     Consent -- --   Time Antigens Placed -- --   Location Back --   Allergen  -- --   Testing Staff IB --   Reviewing Physician -- --   Select Antigens Select Select   Controls     Needle Type -- --   Negative 0.05% Glycerine-Saline (W/F in millimeters) 0/0 --   Positive Histamine 1 mg/ml (W/F in millimeters) 6/20 --   Prick 1.0 mcg/mL     Honey Bee Prick 1.0 mcg/mL (W/F in millimeters) 0/0 --   Yellow Jacket Prick 1.0 mcg/mL (W/F in millimeters) 0/0 --   Yellow Hornet Prick 1.0 mcg/mL (W/F in millimeters) 0/0 --   White Faced Hornet Prick 1.0 mcg/mL (W/F in millimeters) 0/0 --   Paper Wasp Prick 1.0 mcg/mL (W/F in millimeters) 0/0 --   Intradermal 0.001 mcg/mL     Honey Bee Intradermal 0.001 mcg/mL (W/F in millimeters) 0/0 --   Yellow Jacket Intradermal 0.001 mcg/mL (W/F in millimeters) 0/0 --   Yellow Hornet Intradermal 0.001 mcg/mL (W/F in millimeters) 0/0 --   White Faced Hornet Intradermal 0.001 mcg/mL (W/F in millimeters) 0/0 --   Paper Wasp  Intradermal 0.001 mcg/mL (W/F in millimeters) 0/0 --   Intradermal 0.01 mcg/mL     Honey Bee Intradermal 0.01 mcg/mL (W/F in millimeters) 0/0 --   Yellow Jacket Intradermal 0.01 mcg/mL (W/F in millimeters) 0/0 --   Yellow Hornet Intradermal 0.01 mcg/mL (W/F in millimeters) 0/0 --   White Faced Hornet Intradermal 0.01 mcg/mL (W/F in millimeters) 0/0 --   Paper Wasp Intradermal 0.01 mcg/mL (W/F in millimeters) 0/0 --   Intradermal 0.1 mcg/mL     Honey Bee Intradermal 0.1 mcg/mL (W/F in millimeters) 0/0 --   Yellow Jacket Intradermal 0.1 mcg/mL (W/F in millimeters) 0/0 --   Yellow Hornet Intradermal 0.1 mcg/mL (W/F in millimeters) 0/0 --   White Faced Hornet Intradermal 0.1 mcg/mL (W/F in millimeters) 0/0 --   Paper Wasp Intradermal 0.1 mcg/mL (W/F in millimeters) 0/0 --   Intradermal 1.0 mcg/mL     Honey Bee Intradermal 1.0 mcg/mL (W/F in millimeters) 0/0 --   Yellow Jacket Intradermal 1.0 mcg/mL (W/F in millimeters) 0/0 --   Yellow Hornet Intradermal 1.0 mcg/mL (W/F in millimeters) 0/0 --   White Faced Hornet Intradermal 1.0 mcg/mL (W/F in millimeters) 0/0 --   Paper Wasp Intradermal 1.0 mcg/mL (W/F in millimeters) 0/0      Impression report and plan:  1.  History of venom allergy    Allergy testing today was negative.  He had only a 0.14 response to yellowjacket via specific IgE which I do not think is likely clinically significant.  Given that his reaction sounds like it was more large local reaction I think he is at no greater risk than the general population for systemic reaction to bee sting.  I did state if he would like to continue to carry epinephrine that is fine, however, he feels comfortable going without.  Follow-up as needed.

## 2024-01-16 NOTE — LETTER
1/16/2024         RE: Aren Salamanca  [address] [facility] [id number]        Dear Colleague,    Thank you for referring your patient, Aren Salamanca, to the [facility]. Please see a copy of my visit note below.          Subjective  Aren is a 28 year old, presenting for the following health issues:  RECHECK (Venom skin testing)    HPI     Chief complaint:  Venom testing    History of present illness:  This is a pleasant 28 year old man here today to undergo venom skin testing.                    [id number]    Test Information     Consent -- --   Time Antigens Placed -- --   Location Back --   Allergen  -- --   Testing Staff IB --   Reviewing Physician -- --   Select Antigens Select Select   Controls     Needle Type -- --   Negative 0.05% Glycerine-Saline (W/F in millimeters) 0/0 --   Positive Histamine 1 mg/ml (W/F in millimeters) 6/20 --   Prick 1.0 mcg/mL     Honey Bee Prick 1.0 mcg/mL (W/F in millimeters) 0/0 --   Yellow Jacket Prick 1.0 mcg/mL (W/F in millimeters) 0/0 --   Yellow Hornet Prick 1.0 mcg/mL (W/F in millimeters) 0/0 --   White Faced Hornet Prick 1.0 mcg/mL (W/F in millimeters) 0/0 --   Paper Wasp Prick 1.0 mcg/mL (W/F in millimeters) 0/0 --   Intradermal 0.001 mcg/mL     Honey Bee Intradermal 0.001 mcg/mL (W/F in millimeters) 0/0 --   Yellow Jacket Intradermal 0.001 mcg/mL (W/F in millimeters) 0/0 --   Yellow Hornet Intradermal 0.001 mcg/mL (W/F in millimeters) 0/0 --   White Faced Hornet Intradermal 0.001 mcg/mL (W/F in millimeters) 0/0 --   Paper Wasp Intradermal 0.001 mcg/mL (W/F in millimeters) 0/0 --   Intradermal 0.01 mcg/mL     Honey Bee Intradermal 0.01 mcg/mL (W/F in millimeters) 0/0 --   Yellow Jacket Intradermal 0.01 mcg/mL (W/F in millimeters) 0/0 --   Yellow Hornet Intradermal 0.01 mcg/mL (W/F in millimeters) 0/0 --   White Faced Hornet Intradermal 0.01 mcg/mL (W/F in millimeters) 0/0 --   Paper Wasp Intradermal 0.01 mcg/mL (W/F in  millimeters) 0/0 --   Intradermal 0.1 mcg/mL     Honey Bee Intradermal 0.1 mcg/mL (W/F in millimeters) 0/0 --   Yellow Jacket Intradermal 0.1 mcg/mL (W/F in millimeters) 0/0 --   Yellow Hornet Intradermal 0.1 mcg/mL (W/F in millimeters) 0/0 --   White Faced Hornet Intradermal 0.1 mcg/mL (W/F in millimeters) 0/0 --   Paper Wasp Intradermal 0.1 mcg/mL (W/F in millimeters) 0/0 --   Intradermal 1.0 mcg/mL     Honey Bee Intradermal 1.0 mcg/mL (W/F in millimeters) 0/0 --   Yellow Jacket Intradermal 1.0 mcg/mL (W/F in millimeters) 0/0 --   Yellow Hornet Intradermal 1.0 mcg/mL (W/F in millimeters) 0/0 --   White Faced Hornet Intradermal 1.0 mcg/mL (W/F in millimeters) 0/0 --   Paper Wasp Intradermal 1.0 mcg/mL (W/F in millimeters) 0/0      Impression report and plan:  1.  History of venom allergy    Allergy testing today was negative.  He had only a 0.14 response to yellowjacket via specific IgE which I do not think is likely clinically significant.  Given that his reaction sounds like it was more large local reaction I think he is at no greater risk than the general population for systemic reaction to bee sting.  I did state if he would like to continue to carry epinephrine that is fine, however, he feels comfortable going without.  Follow-up as needed.      Again, thank you for allowing me to participate in the care of your patient.        Sincerely,        Maira TABARES MD

## 2024-02-25 ENCOUNTER — HEALTH MAINTENANCE LETTER (OUTPATIENT)
Age: 29
End: 2024-02-25

## 2025-03-15 ENCOUNTER — HEALTH MAINTENANCE LETTER (OUTPATIENT)
Age: 30
End: 2025-03-15